# Patient Record
Sex: MALE | Race: WHITE | NOT HISPANIC OR LATINO | URBAN - METROPOLITAN AREA
[De-identification: names, ages, dates, MRNs, and addresses within clinical notes are randomized per-mention and may not be internally consistent; named-entity substitution may affect disease eponyms.]

---

## 2022-12-01 PROBLEM — E78.00 PURE HYPERCHOLESTEROLEMIA: Status: ACTIVE | Noted: 2022-12-01

## 2022-12-01 PROBLEM — R73.9 HYPERGLYCEMIA: Status: ACTIVE | Noted: 2022-12-01

## 2024-07-30 PROBLEM — E78.5 HYPERLIPIDEMIA: Status: ACTIVE | Noted: 2022-12-01

## 2024-09-25 ENCOUNTER — OFFICE VISIT (OUTPATIENT)
Dept: NEUROLOGY | Facility: CLINIC | Age: 29
End: 2024-09-25
Payer: COMMERCIAL

## 2024-09-25 VITALS
HEART RATE: 105 BPM | TEMPERATURE: 98 F | BODY MASS INDEX: 27.73 KG/M2 | RESPIRATION RATE: 17 BRPM | HEIGHT: 72 IN | DIASTOLIC BLOOD PRESSURE: 94 MMHG | SYSTOLIC BLOOD PRESSURE: 146 MMHG | WEIGHT: 204.69 LBS

## 2024-09-25 DIAGNOSIS — G43.109 MIGRAINE WITH AURA AND WITHOUT STATUS MIGRAINOSUS, NOT INTRACTABLE: ICD-10-CM

## 2024-09-25 DIAGNOSIS — G43.E09 CHRONIC MIGRAINE WITH AURA WITHOUT STATUS MIGRAINOSUS, NOT INTRACTABLE: Primary | ICD-10-CM

## 2024-09-25 PROCEDURE — 99999 PR PBB SHADOW E&M-EST. PATIENT-LVL IV: CPT | Mod: PBBFAC,,, | Performed by: NURSE PRACTITIONER

## 2024-09-25 NOTE — PROGRESS NOTES
Date of service: 9/25/2024  Referring provider: Dr. Gela Groves*    Subjective:      Chief complaint: Headache       Patient ID: Abdulkadir Vega is a 29 y.o. who presents today as a new patient for headache.     History of Present Illness  ORIGINAL HEADACHE HISTORY -   Age at onset and course over time: 2015, onset around college and thought to be stress related. Typically experiences visual aura (scotoma) followed by onset of pain to occipital region. Over the years, typically one migraine every 3 months with gradual increase over the last few months to 2 migraines per month. Overall, no change in description of headache and associated features. He was started on Imitrex 2 weeks ago but has not required use yet.      Location: occipital   Quality:  [] Stabbing [x] Pressure [] Tight [] Throbbing/pounding [] Sharp    Duration: [] Seconds [] Minutes [x] Hours [] Days [] Constant   Frequency: [] Daily [] Weekly [x] Monthly 2x   How many days per month is your head or neck 100% pain free:   Headaches awaken at night?:   No   Worst time of day: mid-day and evening   Intensity of pain: at best 4/10, at worst 7/10   Associated with: [x] Photophobia []  Phonophobia [] Osmophobia [] Loss of appetite [] Nausea [] Vomiting   [] Dizziness [] Vertigo [] Ringing in the ears [x] Blurry vision [] Double vision  [] Anxiety/Anger/Irritability [] Problems with concentration [] Problems with memory [] Problems with task completion   [] Problems with relaxation [] Neck tightness/ neck pain [] Nasal congestion [] Nasal or sinus pressure [] Aura   Alleviated by:  [x] Sleep [x] Darkness [] Local pressure [] Massage [] Heat [] Ice [] Menses [] Medication  Exacerbated by:  [] Fatigue [x] Light [x] Noise [] Smells [] Coughing [] Sneezing  [] Bending over [] Change in weather [] Ovulation [] Menses [] Alcohol [x] Stress []  Food  Ipsilateral autonomic: [] nasal congestion [] lacrimation [] ptosis [] injection [] edema [] foreign body  sensation [] ear fullness   ICP:  [] transient visual obscurations  [] tinnitus   [] positional headache  [] non-positional     Bowl Habits: [x] Normal [] Constipation [] Diarrhea   Caffeine intake: 2 cups coffee, 1 coke during lunch   Sleep habits: good   Water intake: 4-5 cups per day    Eye Exam: past due   Family history of migraine: none   Gyn status (if female) (birth control with estrogen, hysterectomy): n/a   History of asthma, cancer, glaucoma, kidney stones, CVA and osteoporosis: none     HIT 6: 53    Current acute treatment:  Imitrex - has not tried yet   Advil Migraine     Current prevention:  Melatonin  Magnesium   Co-Q10     Previously tried/failed acute treatment:  None     Previously tried/failed preventative treatment:  None     Considerations: None     Review of patient's allergies indicates:  No Known Allergies  Current Outpatient Medications   Medication Sig Dispense Refill    ascorbic acid, vitamin C, (VITAMIN C) 100 MG tablet Take 1,000 mg by mouth once daily.      fenofibrate 160 MG Tab Take 1 tablet (160 mg total) by mouth once daily. 90 tablet 1    Lactobac no.41/Bifidobact no.7 (PROBIOTIC-10 ORAL)       magnesium gluconate 12.5 mg magne- sium (250 mg) Tab       melatonin 5 mg Cap       multivit-minerals/FA/lycopene (MEN'S DAILY MULTIVIT-MINERAL ORAL)       omega-3 fatty acids/fish oil (FISH OIL-OMEGA-3 FATTY ACIDS) 300-1,000 mg capsule Take by mouth once daily.      sumatriptan (IMITREX) 50 MG tablet Take one tablet at onset of headache and can repeat in 2 hours, do not exceed 200 mg in a 24 hour period. 12 tablet 1    ubidecarenone (COQ-10 ORAL)        No current facility-administered medications for this visit.       Past Medical History  Past Medical History:   Diagnosis Date    Hyperlipidemia        Past Surgical History  Past Surgical History:   Procedure Laterality Date    LUNG SURGERY  2011    collapsed lung       Family History  Family History   Problem Relation Name Age of Onset     Heart disease Mother      No Known Problems Father      Heart disease Maternal Grandmother      Heart disease Maternal Grandfather      Heart disease Paternal Grandmother      Heart disease Paternal Grandfather         Social History  Social History     Socioeconomic History    Marital status:    Tobacco Use    Smoking status: Never    Smokeless tobacco: Never   Substance and Sexual Activity    Alcohol use: Never    Drug use: Never     Social Determinants of Health     Financial Resource Strain: Low Risk  (12/8/2023)    Overall Financial Resource Strain (CARDIA)     Difficulty of Paying Living Expenses: Not hard at all   Food Insecurity: No Food Insecurity (12/8/2023)    Hunger Vital Sign     Worried About Running Out of Food in the Last Year: Never true     Ran Out of Food in the Last Year: Never true   Transportation Needs: No Transportation Needs (12/8/2023)    PRAPARE - Transportation     Lack of Transportation (Medical): No     Lack of Transportation (Non-Medical): No   Physical Activity: Insufficiently Active (12/8/2023)    Exercise Vital Sign     Days of Exercise per Week: 2 days     Minutes of Exercise per Session: 30 min   Stress: No Stress Concern Present (12/8/2023)    Citizen of [place] of Occupational Health - Occupational Stress Questionnaire     Feeling of Stress : Only a little   Housing Stability: Low Risk  (12/8/2023)    Housing Stability Vital Sign     Unable to Pay for Housing in the Last Year: No     Number of Places Lived in the Last Year: 1     Unstable Housing in the Last Year: No        Review of Systems  14-point review of systems as follows:   No check kaleb indicates NEGATIVE response   Constitutional: [] weight loss [] change to appetite   Eyes: [x] change in vision [] double vision   Ears, nose, mouth, throat: [] frequent nose bleeds [] ringing in the ears   Respiratory: [] cough [] wheezing   Cardiovascular: [] chest pain [] palpitations   Gastrointestinal: [] jaundice []  nausea/vomiting   Genitourinary: [] incontinence [] burning with urination   Hematologic/lymphatic: [] easy bruising/bleeding [] night sweats   Neurological: [] numbness [] weakness   Endocrine: [] fatigue [] heat/cold intolerance   Allergy/Immunologic: [] fevers [] chills   Musculoskeletal: [] muscle pain [] joint pain   Psychiatric: [] thoughts of harming self/others [] depression   Integumentary: [] rashes [] sores that do not heal     Objective:        Vitals:    09/25/24 1441   BP: (!) 146/94   Pulse: 105   Resp: 17   Temp: 97.5 °F (36.4 °C)     Body mass index is 27.76 kg/m².    Constitutional: appears in no acute distress, well-developed, well-nourished     Eyes: normal conjunctiva, PERRLA    Ears, nose, mouth, throat: external appearance of ears and nose normal, hearing intact     Cardiovascular: n/a     Respiratory: unlabored respirations    Gastrointestinal: no visible abdominal masses, no guarding, no visible hernia    Musculoskeletal: normal tone in all four extremities. No abnormal movements. No pronator drift. No orbit. Symmetric finger tapping. Normal station. Normal regular gait.       Spine:   CERVICAL SPINE:  ROM: normal   MUSCLE SPASM: no   FACET LOADING: no   SPURLING: no  VERONA / KWADWO tender: no     Psychiatric: normal judgment and insight. Oriented to person, place, and time.     Neurologic:   Cortical functions: recent and remote memory intact, normal attention span and concentration, speech fluent, adequate fund of knowledge   Cranial nerves: visual fields full, PERRLA, EOMI, symmetric facial strength, hearing intact, palate elevates symmetrically, shoulder shrug 5/5, tongue protrudes midline   Reflexes: 2+ in the upper and lower extremities, no [place]  Sensation: intact to temperature throughout   Coordination: normal finger to nose, tandem gait     Data Review:     I have personally reviewed the referring provider's notes, labs, & imaging made available to me today.      RADIOLOGY STUDIES:  I  have personally reviewed the pertinent images performed.       No results found for this or any previous visit.    Lab Results   Component Value Date     12/07/2023    K 4.3 12/07/2023     12/07/2023    CO2 22 12/07/2023    BUN 10 12/07/2023    CREATININE 0.69 12/07/2023    GLU 92 12/07/2023    HGBA1C 5.6 12/01/2022    AST 26 12/07/2023    ALT 21 12/07/2023    ALBUMIN 4.9 12/07/2023    PROT 7.3 12/07/2023    BILITOT 0.4 12/07/2023    CHOL 215 (H) 09/05/2024    HDL 23 (L) 09/05/2024    LDLCALC 120.8 09/05/2024    TRIG 356 (H) 09/05/2024       Lab Results   Component Value Date    WBC 6.29 12/07/2023    HGB 14.3 12/07/2023    HCT 42.5 12/07/2023    MCV 86 12/07/2023     12/07/2023       Lab Results   Component Value Date    TSH 1.470 12/07/2023           Assessment & Plan:       Problem List Items Addressed This Visit          Neuro    Migraine with aura and without status migrainosus, not intractable - Primary    Overview     Headaches are typically moderate to severe in intensity, worsen with activity, pounding in quality and associated with sensitivity to light.    Gradual progression pattern, lack of red flag features on history, and normal neurological exam are reassuring for primary as opposed to secondary etiology of headaches thus imaging will not be pursued for this history and this exam at this time.    Increase Magnesium glycinate to 400-500 mg nightly. Continue Imitrex as needed, has not tried yet. Ok to repeat dose 2 hours later. No more than 200 mg per 24 hours. Ok to continue to over the counter medication, no more than twice per week as needed. Update eye exam. Increase water intake. Headache journal.                 Please call our clinic at [phone] or send a message on the [employer] portal if there are any changes to the plan described below, for example,if you are not contacted for the requested tests, referral(s) within one week, if you are unable to receive the medications  prescribed, or if you feel you need to change the treatment course for any reason.     TESTING: none.     REFERRALS: Update eye exam     PREVENTION (use daily regardless of headache):  - Increase Magnesium glycinate to 400-500 mg daily (hardest to find, look online, but most bowel-neutral, best absorbed)     AS-NEEDED TREATMENT (use total no more than 10 days per month unless otherwise stated):  - Continue Imitrex as needed. Ok to repeat dose 2 hours later. No more than 200 mg per 24 hours.   - Ok to continue to over the counter medication, no more than twice per week as needed     OTHER:   - Increase water intake   - Headache journal     No follow-ups on file.       Leatha Santamaria NP-C      I have spent 60 minutes of total time on the total encounter which includes face to face time and non-face to face time preparing to see the patient (eg, review of labs, previous encounters, care everywhere), obtaining and/or reviewing separately obtained history, documenting clinical information in the electronic or health record, independently interpreting results, and communicating results to the patient/family/caregiver, or care coordination.

## 2024-09-25 NOTE — PATIENT INSTRUCTIONS
Please call our clinic at [phone] or send a message on the [employer] portal if there are any changes to the plan described below, for example,if you are not contacted for the requested tests, referral(s) within one week, if you are unable to receive the medications prescribed, or if you feel you need to change the treatment course for any reason.     TESTING: none.     REFERRALS: Update eye exam     PREVENTION (use daily regardless of headache):  -  Increase Magnesium glycinate to 400-500 mg daily (hardest to find, look online, but most bowel-neutral, best absorbed)     AS-NEEDED TREATMENT (use total no more than 10 days per month unless otherwise stated):  - Continue Imitrex as needed. Ok to repeat dose 2 hours later. No more than 200 mg per 24 hours.   - Ok to continue to over the counter medication, no more than twice per week as needed     OTHER:   - Increase water intake   - Headache journal